# Patient Record
Sex: FEMALE | Race: WHITE | HISPANIC OR LATINO | Employment: UNEMPLOYED | URBAN - METROPOLITAN AREA
[De-identification: names, ages, dates, MRNs, and addresses within clinical notes are randomized per-mention and may not be internally consistent; named-entity substitution may affect disease eponyms.]

---

## 2017-08-22 ENCOUNTER — HOSPITAL ENCOUNTER (EMERGENCY)
Facility: HOSPITAL | Age: 37
Discharge: HOME | End: 2017-08-22
Attending: FAMILY MEDICINE

## 2017-08-22 VITALS
RESPIRATION RATE: 18 BRPM | HEART RATE: 82 BPM | BODY MASS INDEX: 50.02 KG/M2 | HEIGHT: 64 IN | DIASTOLIC BLOOD PRESSURE: 81 MMHG | OXYGEN SATURATION: 98 % | WEIGHT: 293 LBS | SYSTOLIC BLOOD PRESSURE: 145 MMHG | TEMPERATURE: 99 F

## 2017-08-22 DIAGNOSIS — R07.9 CHEST PAIN: ICD-10-CM

## 2017-08-22 DIAGNOSIS — R51.9 HEADACHE: ICD-10-CM

## 2017-08-22 LAB
ALBUMIN SERPL BCP-MCNC: 3.1 G/DL
ALP SERPL-CCNC: 93 U/L
ALT SERPL W/O P-5'-P-CCNC: 33 U/L
ANION GAP SERPL CALC-SCNC: 9 MMOL/L
AST SERPL-CCNC: 23 U/L
B-HCG UR QL: NEGATIVE
BASOPHILS # BLD AUTO: 0.02 K/UL
BASOPHILS NFR BLD: 0.3 %
BILIRUB SERPL-MCNC: 0.3 MG/DL
BNP SERPL-MCNC: 16 PG/ML
BUN SERPL-MCNC: 16 MG/DL
CALCIUM SERPL-MCNC: 8.9 MG/DL
CHLORIDE SERPL-SCNC: 107 MMOL/L
CO2 SERPL-SCNC: 25 MMOL/L
CREAT SERPL-MCNC: 0.9 MG/DL
CTP QC/QA: YES
DIFFERENTIAL METHOD: ABNORMAL
EOSINOPHIL # BLD AUTO: 0.3 K/UL
EOSINOPHIL NFR BLD: 4.9 %
ERYTHROCYTE [DISTWIDTH] IN BLOOD BY AUTOMATED COUNT: 14.6 %
EST. GFR  (AFRICAN AMERICAN): >60 ML/MIN/1.73 M^2
EST. GFR  (NON AFRICAN AMERICAN): >60 ML/MIN/1.73 M^2
GLUCOSE SERPL-MCNC: 96 MG/DL
HCT VFR BLD AUTO: 38.2 %
HGB BLD-MCNC: 12.5 G/DL
LYMPHOCYTES # BLD AUTO: 2.5 K/UL
LYMPHOCYTES NFR BLD: 38.3 %
MCH RBC QN AUTO: 27.4 PG
MCHC RBC AUTO-ENTMCNC: 32.7 G/DL
MCV RBC AUTO: 84 FL
MONOCYTES # BLD AUTO: 0.5 K/UL
MONOCYTES NFR BLD: 7.5 %
NEUTROPHILS # BLD AUTO: 3.2 K/UL
NEUTROPHILS NFR BLD: 48.8 %
PLATELET # BLD AUTO: 330 K/UL
PMV BLD AUTO: 10.7 FL
POTASSIUM SERPL-SCNC: 3.8 MMOL/L
PROT SERPL-MCNC: 7.5 G/DL
RBC # BLD AUTO: 4.56 M/UL
SODIUM SERPL-SCNC: 141 MMOL/L
TROPONIN I SERPL DL<=0.01 NG/ML-MCNC: <0.006 NG/ML
TSH SERPL DL<=0.005 MIU/L-ACNC: 2.58 UIU/ML
WBC # BLD AUTO: 6.56 K/UL

## 2017-08-22 PROCEDURE — 84484 ASSAY OF TROPONIN QUANT: CPT

## 2017-08-22 PROCEDURE — 96374 THER/PROPH/DIAG INJ IV PUSH: CPT

## 2017-08-22 PROCEDURE — 99284 EMERGENCY DEPT VISIT MOD MDM: CPT | Mod: ,,, | Performed by: EMERGENCY MEDICINE

## 2017-08-22 PROCEDURE — 63600175 PHARM REV CODE 636 W HCPCS: Performed by: EMERGENCY MEDICINE

## 2017-08-22 PROCEDURE — 93010 ELECTROCARDIOGRAM REPORT: CPT | Mod: ,,, | Performed by: INTERNAL MEDICINE

## 2017-08-22 PROCEDURE — 85025 COMPLETE CBC W/AUTO DIFF WBC: CPT

## 2017-08-22 PROCEDURE — 93005 ELECTROCARDIOGRAM TRACING: CPT

## 2017-08-22 PROCEDURE — 83880 ASSAY OF NATRIURETIC PEPTIDE: CPT

## 2017-08-22 PROCEDURE — 99284 EMERGENCY DEPT VISIT MOD MDM: CPT | Mod: 25

## 2017-08-22 PROCEDURE — 81025 URINE PREGNANCY TEST: CPT | Performed by: FAMILY MEDICINE

## 2017-08-22 PROCEDURE — 84443 ASSAY THYROID STIM HORMONE: CPT

## 2017-08-22 PROCEDURE — 80053 COMPREHEN METABOLIC PANEL: CPT

## 2017-08-22 RX ORDER — FLUOXETINE HYDROCHLORIDE 20 MG/1
20 CAPSULE ORAL DAILY
COMMUNITY

## 2017-08-22 RX ORDER — LEVOTHYROXINE SODIUM 50 UG/1
50 TABLET ORAL DAILY
COMMUNITY

## 2017-08-22 RX ORDER — LOSARTAN POTASSIUM 25 MG/1
100 TABLET ORAL DAILY
COMMUNITY

## 2017-08-22 RX ORDER — METOCLOPRAMIDE HYDROCHLORIDE 5 MG/ML
10 INJECTION INTRAMUSCULAR; INTRAVENOUS
Status: COMPLETED | OUTPATIENT
Start: 2017-08-22 | End: 2017-08-22

## 2017-08-22 RX ADMIN — METOCLOPRAMIDE 10 MG: 5 INJECTION, SOLUTION INTRAMUSCULAR; INTRAVENOUS at 05:08

## 2017-08-22 NOTE — ED PROVIDER NOTES
Encounter Date: 8/22/2017       History     Chief Complaint   Patient presents with    Headache     vomiting since friday     Ellie Ray is a 37 y.o. Bengali speaking female who  has a past medical history of Hypertension; Hypothyroid; and Obesity.    Patient presents with chief comlaint of headache.  Reports pain started last week, has been taking asprin with no relief.  Reports nausea, and phonophobia, but no photophobia.  Pain extends from head to the neck.  Patient says she has active chest pain which started yesterday in her left chest, she has a cough that produces yellow to green phlem which she contributes to the flue she had 2 weeks ago.  Denies F/C/N/V                Review of patient's allergies indicates:  No Known Allergies  Past Medical History:   Diagnosis Date    Hypertension     Hypothyroid     Obesity      History reviewed. No pertinent surgical history.  Family History   Problem Relation Age of Onset    No Known Problems Mother     No Known Problems Father      Social History   Substance Use Topics    Smoking status: Never Smoker    Smokeless tobacco: Never Used    Alcohol use No     Review of Systems   Constitutional: Negative for chills and fever.   HENT: Positive for congestion and sore throat.    Eyes: Negative for photophobia and visual disturbance.   Respiratory: Positive for cough. Negative for shortness of breath.    Cardiovascular: Positive for chest pain. Negative for palpitations and leg swelling.   Gastrointestinal: Positive for nausea. Negative for abdominal distention, abdominal pain, blood in stool, diarrhea and vomiting.        Gastric reflux   Genitourinary: Negative for dysuria and hematuria.   Musculoskeletal: Negative for arthralgias and gait problem.   Skin: Negative for rash and wound.   Neurological: Positive for headaches. Negative for dizziness, weakness and light-headedness.   Psychiatric/Behavioral: Negative for confusion and self-injury.       Physical Exam      Initial Vitals [08/22/17 1415]   BP Pulse Resp Temp SpO2   135/89 100 18 99.2 °F (37.3 °C) 100 %      MAP       104.33         Physical Exam  Constitutional: Patient appears well-developed and well-nourished. No distress.   HENT:   Head: Normocephalic and atraumatic.   Neck: No tracheal deviation present.   Cardiovascular: Normal rate, regular rhythm, normal heart sounds and intact distal pulses. Exam reveals no gallop and no friction rub.   Pain reproducable with pressure to chest.  No murmur heard.  Pulmonary/Chest: Breath sounds normal. No stridor. No respiratory distress. Patient has no wheezes. Patient has no rhonchi. Patient has no rales. Patient exhibits no tenderness.   Abdominal: Soft. Bowel sounds are normal. Patient exhibits no distension. There is no tenderness.   Musculoskeletal: Normal range of motion. Patient exhibits no edema or tenderness.   Neurological: Patient is alert and oriented to person, place, and time. Patient has normal strength.   Psychiatric: Patient has a normal mood and affect. Thought content normal.       ED Course   Procedures  Labs Reviewed   CBC W/ AUTO DIFFERENTIAL - Abnormal; Notable for the following:        Result Value    RDW 14.6 (*)     All other components within normal limits   COMPREHENSIVE METABOLIC PANEL - Abnormal; Notable for the following:     Albumin 3.1 (*)     All other components within normal limits   B-TYPE NATRIURETIC PEPTIDE   TROPONIN I   TSH   POCT URINE PREGNANCY                   APC / Resident Notes:   -Ordered EKG, Trop, BNP, CBC, CMP, TSH, CT head  Matt Novoa, PGY1     [place] of Care  I discussed this patient's historical and physical findings, as well as the current plan for this patient. I reevaluated the patient and found that her lab results show no emergent abnormalities. Trop x1 was negative, as was ecg. CT head showed no concerning findings. I discussed these results with the pt (using a  phone) and also discussed the  utility of obtaining an MRI Brain to further characterize the source of the pain, as well as look for any masses or inflammation that may have been missed on Ct. On reexamination of the pt, she has CN II-XII in tact, nl gait and strength in arms and legs. She does not complain of dizziness, photophobia, meningismus. She no longer has chest pain, and states her HA is now very mild and not bothering her. She would like to go home and does not want an MRI here. Given the HA has responded to reglan and the chest pain is no longer present, I feel the symptoms may have been caused by a migraine. I will discharge the pt home and have given clear return precautions using a : please return to the emergency department if you have any difficulty with gait, weakness in arms/legs, severe return of Noel. She agrees, and will follow up with her regular PCP for further management.    Sage Diaz  Resident, PGY-1  8/22/2017 7:21 PM          Attending Attestation:   Physician Attestation Statement for Resident:  As the supervising MD   Physician Attestation Statement: I have personally seen and examined this patient.   I agree with the above history. -: 38 yo healthy f, here with HA x 1 week, gradual onset, diffuse and severe with neck pain.  No fever/chills, no photophobia.  + n/v.  No h/o migraines/HA disorder.  No recent travel.  No visual changes.  No rash.  Today also had CP, worse w pushing on her chest so became concerned.  No SOB.  No pain with exertion. In the ED, pt morbidly obese, VSS and pt well-appearing.  PERRL, no photophobia, neuro exam normal.  ddx would be broad, including SAH, venous thrombosis, idiopathic intracranial HTN.  Meningitis seems less likely given lack of any infectious sx.  Given pt's significant body habitus, I would NOT be able to obtain a LP to evaluate for meningitis, though again my suspicion is low.  Will get a MRI brain with a MRA to evaluate for aneurysm and if this study is negative,  will d/c home with close outpatient f/u with neurology for further work-up   As the supervising MD I agree with the above PE.    As the supervising MD I agree with the above treatment, course, plan, and disposition.  I have reviewed and agree with the residents interpretation of the following: lab data and CT scans.                    ED Course     Clinical Impression:   Diagnoses of Chest pain and Headache were pertinent to this visit.                           Sage Diaz MD  Resident  08/23/17 1339

## 2017-08-22 NOTE — PROVIDER PROGRESS NOTES - EMERGENCY DEPT.
Encounter Date: 8/22/2017    ED Physician Progress Notes         normal sinus rhythm.  Normal EKG.  No STEMI

## 2017-08-22 NOTE — PROVIDER PROGRESS NOTES - EMERGENCY DEPT.
Encounter Date: 8/22/2017    ED Physician Progress Notes       SCRIBE NOTE: I, Butch Guo, am scribing for, and in the presence of,  Dr. Taylor.  Physician Statement: I, Dr. Taylor, personally performed the services described in this documentation as scribed by Butch Guo in my presence, and it is both accurate and complete.     Physician Note:   Pt is a 37 y.o. female with a history of HTN and hypothyroidism who comes to the ER complaining of chest pain for several days, difficulty controlling blood pressure, and headaches. Blood pressure today is 135/89. Her initial exam is benign. EKG looks unremarkable. We will start a cardiac work up including chest x-ray and refer her to the main ED for final disposition.

## 2017-08-22 NOTE — ED TRIAGE NOTES
Complaint of chest pain and headache for 5 days.    GENERAL: The patient is morbidly obese femalein no apparent distress. Alert and oriented x4.                                                HEENT: Head is normocephalic and atraumatic. Extraocular muscles are intact. Pupils are equal, round, and reactive to light and accommodation. Nares appeared normal. Mouth is well hydrated and without lesions. Mucous membranes are moist. Posterior pharynx clear of any exudate or lesions.    NECK: Supple. No carotid bruits. No lymphadenopathy or thyromegaly.    LUNGS: Clear to auscultation.    HEART: Regular rate and rhythm without murmur.     ABDOMEN: Soft, nontender, and nondistended. Positive bowel sounds. No hepatosplenomegaly was noted.     EXTREMITIES: Without any cyanosis, clubbing, rash, lesions or edema.     NEUROLOGIC: Cranial nerves II through XII are grossly intact.     PSYCHIATRIC: Flat affect, but denies suicidal or homicidal ideations.    SKIN: No ulceration or induration present.

## 2017-09-14 ENCOUNTER — HOSPITAL ENCOUNTER (EMERGENCY)
Facility: HOSPITAL | Age: 37
Discharge: HOME | End: 2017-09-14
Attending: EMERGENCY MEDICINE

## 2017-09-14 VITALS
OXYGEN SATURATION: 98 % | TEMPERATURE: 99 F | WEIGHT: 293 LBS | SYSTOLIC BLOOD PRESSURE: 120 MMHG | BODY MASS INDEX: 39.68 KG/M2 | HEART RATE: 68 BPM | HEIGHT: 72 IN | RESPIRATION RATE: 20 BRPM | DIASTOLIC BLOOD PRESSURE: 58 MMHG

## 2017-09-14 DIAGNOSIS — T50.905A MEDICATION REACTION, INITIAL ENCOUNTER: Primary | ICD-10-CM

## 2017-09-14 DIAGNOSIS — N39.0 URINARY TRACT INFECTION WITHOUT HEMATURIA, SITE UNSPECIFIED: ICD-10-CM

## 2017-09-14 DIAGNOSIS — R00.0 TACHYCARDIA: ICD-10-CM

## 2017-09-14 DIAGNOSIS — R00.2 PALPITATIONS: ICD-10-CM

## 2017-09-14 LAB
ALBUMIN SERPL BCP-MCNC: 3.6 G/DL
ALP SERPL-CCNC: 97 U/L
ALT SERPL W/O P-5'-P-CCNC: 42 U/L
ANION GAP SERPL CALC-SCNC: 9 MMOL/L
AST SERPL-CCNC: 28 U/L
B-HCG UR QL: NEGATIVE
BACTERIA #/AREA URNS HPF: ABNORMAL /HPF
BASOPHILS # BLD AUTO: 0.02 K/UL
BASOPHILS NFR BLD: 0.3 %
BILIRUB SERPL-MCNC: 0.3 MG/DL
BILIRUB UR QL STRIP: NEGATIVE
BUN SERPL-MCNC: 9 MG/DL
CALCIUM SERPL-MCNC: 9.8 MG/DL
CHLORIDE SERPL-SCNC: 103 MMOL/L
CLARITY UR: CLEAR
CO2 SERPL-SCNC: 25 MMOL/L
COLOR UR: ABNORMAL
CREAT SERPL-MCNC: 0.8 MG/DL
CTP QC/QA: YES
D DIMER PPP IA.FEU-MCNC: 0.37 MG/L FEU
DIFFERENTIAL METHOD: ABNORMAL
EOSINOPHIL # BLD AUTO: 0.2 K/UL
EOSINOPHIL NFR BLD: 3 %
ERYTHROCYTE [DISTWIDTH] IN BLOOD BY AUTOMATED COUNT: 14.9 %
EST. GFR  (AFRICAN AMERICAN): >60 ML/MIN/1.73 M^2
EST. GFR  (NON AFRICAN AMERICAN): >60 ML/MIN/1.73 M^2
GLUCOSE SERPL-MCNC: 138 MG/DL
GLUCOSE UR QL STRIP: NEGATIVE
HCT VFR BLD AUTO: 40.3 %
HGB BLD-MCNC: 13.2 G/DL
HGB UR QL STRIP: ABNORMAL
KETONES UR QL STRIP: NEGATIVE
LEUKOCYTE ESTERASE UR QL STRIP: ABNORMAL
LYMPHOCYTES # BLD AUTO: 1.3 K/UL
LYMPHOCYTES NFR BLD: 21.3 %
MAGNESIUM SERPL-MCNC: 2.1 MG/DL
MCH RBC QN AUTO: 27.3 PG
MCHC RBC AUTO-ENTMCNC: 32.8 G/DL
MCV RBC AUTO: 83 FL
MICROSCOPIC COMMENT: ABNORMAL
MONOCYTES # BLD AUTO: 0.4 K/UL
MONOCYTES NFR BLD: 6.4 %
NEUTROPHILS # BLD AUTO: 4.2 K/UL
NEUTROPHILS NFR BLD: 68.3 %
NITRITE UR QL STRIP: NEGATIVE
PH UR STRIP: 6 [PH] (ref 5–8)
PLATELET # BLD AUTO: 305 K/UL
PMV BLD AUTO: 10.9 FL
POTASSIUM SERPL-SCNC: 3.6 MMOL/L
PROT SERPL-MCNC: 8.1 G/DL
PROT UR QL STRIP: NEGATIVE
RBC # BLD AUTO: 4.83 M/UL
RBC #/AREA URNS HPF: 14 /HPF (ref 0–4)
SODIUM SERPL-SCNC: 137 MMOL/L
SP GR UR STRIP: <=1.005 (ref 1–1.03)
SQUAMOUS #/AREA URNS HPF: 2 /HPF
URN SPEC COLLECT METH UR: ABNORMAL
UROBILINOGEN UR STRIP-ACNC: NEGATIVE EU/DL
WBC # BLD AUTO: 6.09 K/UL
WBC #/AREA URNS HPF: 7 /HPF (ref 0–5)

## 2017-09-14 PROCEDURE — 81000 URINALYSIS NONAUTO W/SCOPE: CPT

## 2017-09-14 PROCEDURE — 83735 ASSAY OF MAGNESIUM: CPT

## 2017-09-14 PROCEDURE — 87186 SC STD MICRODIL/AGAR DIL: CPT

## 2017-09-14 PROCEDURE — 87077 CULTURE AEROBIC IDENTIFY: CPT

## 2017-09-14 PROCEDURE — 81025 URINE PREGNANCY TEST: CPT | Performed by: EMERGENCY MEDICINE

## 2017-09-14 PROCEDURE — 85025 COMPLETE CBC W/AUTO DIFF WBC: CPT

## 2017-09-14 PROCEDURE — 99284 EMERGENCY DEPT VISIT MOD MDM: CPT | Mod: 25

## 2017-09-14 PROCEDURE — 80053 COMPREHEN METABOLIC PANEL: CPT

## 2017-09-14 PROCEDURE — 87086 URINE CULTURE/COLONY COUNT: CPT

## 2017-09-14 PROCEDURE — 87088 URINE BACTERIA CULTURE: CPT

## 2017-09-14 PROCEDURE — 93005 ELECTROCARDIOGRAM TRACING: CPT

## 2017-09-14 PROCEDURE — 85379 FIBRIN DEGRADATION QUANT: CPT

## 2017-09-14 RX ORDER — CEPHALEXIN 500 MG/1
500 CAPSULE ORAL EVERY 8 HOURS
Qty: 15 CAPSULE | Refills: 0 | Status: SHIPPED | OUTPATIENT
Start: 2017-09-14 | End: 2017-09-18 | Stop reason: ALTCHOICE

## 2017-09-15 NOTE — DISCHARGE INSTRUCTIONS
TAKE MEDS AS DIRECTED    AVOID EXCEDRIN    CALL YOUR PRIMARY DOCTOR TO SCHEDULE A FOLLOW UP EVALUATION NEXT WEEK    RETURN TO ED IF SYMPTOMS WORSEN

## 2017-09-15 NOTE — ED PROVIDER NOTES
Encounter Date: 9/14/2017       History     Chief Complaint   Patient presents with    Palpitations     reports increase in heart rate after taking excedrin      38 Y/O F WITH PMHX OF HYPOTHYROIDISM, HTN, AND OBESITY PRESENTS TO ED WITH C/O PALPITATIONS SINCE 1530.  PT WAS SEEN BY HER PCP AT [facility] YESTERDAY.  HER PROZAC DOSE WAS INCREASED DURING THIS VISIT.  NO OTHER MEDICATION CHANGES MADE.  THE PATIENT HAS CHRONIC HA FOR THE PAST YEAR AND STATES THAT SHE HAD A VERY MILD, THROBBING, NON-RADIATING, LEFT SIDED HA THIS MORNING AND TOOK TWO EXCEDRIN AND TWO ASA AT 1200.  THE HA RESOLVED.  AT 1530, THE PATIENT TOOK ANOTHER 2 EXCEDRIN AND 2 ASA TO MAKE SURE THE HA DIDN'T COME BACK.  PT HAS A HX OF HA AND STATES THAT COMPARED TO PREVIOUS THIS HA WAS VERY MILD PAIN.  NOT WORST HA OF LIFE.  NOT OF THUNDERCLAP ONSET.  NO ASSOCIATED NUMBNESS/WEAKNESS/TINGLING, VISION CHANGES, OR SPEECH CHANGES.  PT BEGAN FEELING HER HEART RACE 30 MINUTES OR SO AFTER TAKING THE SECOND DOSE OF EXCEDRIN.  SHE NEVER FELT ANY CP OR SOB.  NO FEVER/CHILLS.  + DIAPHORESIS.  NO N/V.  NO ABD PAIN.  PT DENIES ANY RECENT ILLNESS OR PREVIOUS SIMILAR SYMPTOMS.  PT CURRENTLY FEELS BETTER.      PT INTERVIEWED VIA  LINE.           Review of patient's allergies indicates:  No Known Allergies  Past Medical History:   Diagnosis Date    Hypertension     Hypothyroid     Obesity      History reviewed. No pertinent surgical history.  Family History   Problem Relation Age of Onset    No Known Problems Mother     No Known Problems Father      Social History   Substance Use Topics    Smoking status: Never Smoker    Smokeless tobacco: Never Used    Alcohol use No     Review of Systems   Constitutional: Positive for diaphoresis. Negative for activity change, appetite change, chills, fatigue and fever.   HENT: Negative for congestion.    Eyes: Negative for visual disturbance.   Respiratory: Negative for cough, chest tightness and  shortness of breath.    Cardiovascular: Negative for chest pain and palpitations.   Gastrointestinal: Negative for abdominal pain, diarrhea, nausea and vomiting.   Endocrine: Negative for polydipsia and polyphagia.   Genitourinary: Negative for difficulty urinating, dysuria, flank pain and frequency.   Musculoskeletal: Negative for back pain.   Skin: Negative for pallor and rash.   Allergic/Immunologic: Negative for immunocompromised state.   Neurological: Positive for headaches (PT HAS A HX OF HEADACHES FOR THE PAST YEAR AND HAD A HA TODAY THAT SHE WAS TREATING WITH EXCEDRINE.  SWAN IS NOW RESOLVED). Negative for dizziness, tremors, seizures, syncope, facial asymmetry, speech difficulty, weakness, light-headedness and numbness.   Hematological: Does not bruise/bleed easily.   Psychiatric/Behavioral: Negative for confusion. The patient is nervous/anxious.    All other systems reviewed and are negative.      Physical Exam     Initial Vitals [09/14/17 1827]   BP Pulse Resp Temp SpO2   (!) 189/96 (!) 114 20 98.8 °F (37.1 °C) 99 %      MAP       127         Physical Exam    Constitutional: She appears well-developed and well-nourished. She is diaphoretic.   HENT:   Head: Normocephalic and atraumatic.   Mouth/Throat: Oropharynx is clear and moist.   Eyes: Conjunctivae and EOM are normal. Pupils are equal, round, and reactive to light. No scleral icterus.   Neck: Normal range of motion. Neck supple.   Cardiovascular: Regular rhythm and normal heart sounds.   TACHYCARDIA   Pulmonary/Chest: Breath sounds normal. No respiratory distress. She has no wheezes. She has no rhonchi. She has no rales.   Abdominal: Soft. Bowel sounds are normal. She exhibits no distension. There is no tenderness.   Musculoskeletal: Normal range of motion. She exhibits no edema or tenderness.   Neurological: She is alert and oriented to person, place, and time. She has normal strength. No cranial nerve deficit or sensory deficit.   Skin: Skin is warm.  No pallor.   Psychiatric: Her behavior is normal. Judgment and thought content normal.   ANXIOUS         ED Course   Procedures  Labs Reviewed   CBC W/ AUTO DIFFERENTIAL   COMPREHENSIVE METABOLIC PANEL   URINALYSIS   MAGNESIUM     EKG Readings: (Independently Interpreted)   Initial Reading: No STEMI. Rhythm: Sinus Tachycardia. Heart Rate: 109. Ectopy: No Ectopy. Clinical Impression: Sinus Tachycardia       X-Rays:   Independently Interpreted Readings:   Chest X-Ray: Normal heart size.  No infiltrates.  No acute abnormalities.     Medical Decision Making:   Initial Assessment:   36 Y/O WITH PALPITATIONS AFTER TAKING TWO DOSES OF EXCEDRIN IN 3.5 HOURS.  NO CP OR SOB.      ON EXAM, PT IS IN NO DISTRESS.    Differential Diagnosis:   DDX:  METABOLIC DERANGEMENT, ANEMIA, DEHYDRATION, MEDICATION REACTION, ACS, PULMONARY EMBOLUS, ARRHYTHMIA  Independently Interpreted Test(s):   I have ordered and independently interpreted X-rays - see prior notes.  I have ordered and independently interpreted EKG Reading(s) - see prior notes  Clinical Tests:   Lab Tests: Ordered and Reviewed  The following lab test(s) were unremarkable: CBC, CMP and D-Dimer       <> Summary of Lab: U/A:  + UTI  MAG NORMAL    Radiological Study: Ordered and Reviewed  Medical Tests: Ordered and Reviewed  ED Management:  PT WORK UP + FOR UTI.  OTHERWISE NO ACUTE FINDINGS NOTED.  THE PATIENT IS WELL APPEARING AND NON-TOXIC.  ON RE-EVALUATION, SHE IS RESTING IN STRETCHER WITH  AND DAUGHTER AT BEDSIDE.  NO SYMPTOMS.  NO DISTRESS.  PALPITATIONS HAVE RESOLVED.  I WILL START KEFLEX FOR UTI AND SEND A URINE CULTURE.  PT INSTRUCTED TO F/U WITH HER PCP NEXT WEEK.  SHE IS INSTRUCTED TO RETURN TO ED IMMEDIATELY IF SYMPTOMS WORSEN IN ANY WAY.     Pt counseled on their diagnosis and the importance of following up with PCP.  Pt also cautioned on when to return to ED.  Pt verbalizes understanding of discharge plan and will return to ED immediately if symptoms  worsen                     ED Course      Clinical Impression:   The primary encounter diagnosis was Medication reaction, initial encounter. Diagnoses of Tachycardia, Palpitations, and Urinary tract infection without hematuria, site unspecified were also pertinent to this visit.    Disposition:   Disposition: Discharged  Condition: Stable                        Susie Valentin MD  09/14/17 9824

## 2017-09-15 NOTE — ED NOTES
Pt states this a.m. She had vomiting with nausea and a headache-took 2 aspirins and an excedrin and then developed palpitations--pt currently is diaphoretic,resp are regular and unlabored.deneis chest pain. Pt is alert and oriented x3 with clear speech and followsing commands. Skin is normal coloring. Pt feels hot to touch.

## 2017-09-18 ENCOUNTER — HOSPITAL ENCOUNTER (EMERGENCY)
Facility: HOSPITAL | Age: 37
Discharge: HOME | End: 2017-09-18
Attending: EMERGENCY MEDICINE

## 2017-09-18 VITALS
OXYGEN SATURATION: 95 % | HEART RATE: 100 BPM | TEMPERATURE: 98 F | WEIGHT: 293 LBS | RESPIRATION RATE: 20 BRPM | BODY MASS INDEX: 45.99 KG/M2 | HEIGHT: 67 IN | SYSTOLIC BLOOD PRESSURE: 129 MMHG | DIASTOLIC BLOOD PRESSURE: 80 MMHG

## 2017-09-18 DIAGNOSIS — N39.0 URINARY TRACT INFECTION WITHOUT HEMATURIA, SITE UNSPECIFIED: ICD-10-CM

## 2017-09-18 DIAGNOSIS — R00.2 PALPITATIONS: Primary | ICD-10-CM

## 2017-09-18 LAB
ALBUMIN SERPL BCP-MCNC: 3.3 G/DL
ALP SERPL-CCNC: 109 U/L
ALT SERPL W/O P-5'-P-CCNC: 37 U/L
ANION GAP SERPL CALC-SCNC: 9 MMOL/L
AST SERPL-CCNC: 20 U/L
B-HCG UR QL: NEGATIVE
BACTERIA #/AREA URNS HPF: ABNORMAL /HPF
BACTERIA UR CULT: NORMAL
BASOPHILS # BLD AUTO: 0.03 K/UL
BASOPHILS NFR BLD: 0.5 %
BILIRUB SERPL-MCNC: 0.3 MG/DL
BILIRUB UR QL STRIP: NEGATIVE
BUN SERPL-MCNC: 18 MG/DL
CALCIUM SERPL-MCNC: 9.4 MG/DL
CHLORIDE SERPL-SCNC: 102 MMOL/L
CLARITY UR: ABNORMAL
CO2 SERPL-SCNC: 26 MMOL/L
COLOR UR: YELLOW
CREAT SERPL-MCNC: 0.9 MG/DL
CTP QC/QA: YES
DIFFERENTIAL METHOD: ABNORMAL
EOSINOPHIL # BLD AUTO: 0.2 K/UL
EOSINOPHIL NFR BLD: 2.3 %
ERYTHROCYTE [DISTWIDTH] IN BLOOD BY AUTOMATED COUNT: 15.1 %
EST. GFR  (AFRICAN AMERICAN): >60 ML/MIN/1.73 M^2
EST. GFR  (NON AFRICAN AMERICAN): >60 ML/MIN/1.73 M^2
GLUCOSE SERPL-MCNC: 142 MG/DL
GLUCOSE UR QL STRIP: NEGATIVE
HCT VFR BLD AUTO: 40.4 %
HGB BLD-MCNC: 13 G/DL
HGB UR QL STRIP: ABNORMAL
KETONES UR QL STRIP: NEGATIVE
LEUKOCYTE ESTERASE UR QL STRIP: ABNORMAL
LYMPHOCYTES # BLD AUTO: 2.1 K/UL
LYMPHOCYTES NFR BLD: 32.1 %
MCH RBC QN AUTO: 27.4 PG
MCHC RBC AUTO-ENTMCNC: 32.2 G/DL
MCV RBC AUTO: 85 FL
MICROSCOPIC COMMENT: ABNORMAL
MONOCYTES # BLD AUTO: 0.5 K/UL
MONOCYTES NFR BLD: 8.3 %
NEUTROPHILS # BLD AUTO: 3.7 K/UL
NEUTROPHILS NFR BLD: 56.6 %
NITRITE UR QL STRIP: NEGATIVE
PH UR STRIP: 6 [PH] (ref 5–8)
PLATELET # BLD AUTO: 322 K/UL
PMV BLD AUTO: 10.1 FL
POTASSIUM SERPL-SCNC: 3.4 MMOL/L
PROT SERPL-MCNC: 7.5 G/DL
PROT UR QL STRIP: ABNORMAL
RBC # BLD AUTO: 4.75 M/UL
RBC #/AREA URNS HPF: 20 /HPF (ref 0–4)
SODIUM SERPL-SCNC: 137 MMOL/L
SP GR UR STRIP: 1.01 (ref 1–1.03)
SQUAMOUS #/AREA URNS HPF: 16 /HPF
TSH SERPL DL<=0.005 MIU/L-ACNC: 3.69 UIU/ML
URN SPEC COLLECT METH UR: ABNORMAL
UROBILINOGEN UR STRIP-ACNC: NEGATIVE EU/DL
WBC # BLD AUTO: 6.52 K/UL
WBC #/AREA URNS HPF: 45 /HPF (ref 0–5)

## 2017-09-18 PROCEDURE — 25000003 PHARM REV CODE 250: Performed by: EMERGENCY MEDICINE

## 2017-09-18 PROCEDURE — 93005 ELECTROCARDIOGRAM TRACING: CPT

## 2017-09-18 PROCEDURE — 81025 URINE PREGNANCY TEST: CPT | Performed by: EMERGENCY MEDICINE

## 2017-09-18 PROCEDURE — 99284 EMERGENCY DEPT VISIT MOD MDM: CPT | Mod: 25

## 2017-09-18 PROCEDURE — 81000 URINALYSIS NONAUTO W/SCOPE: CPT

## 2017-09-18 PROCEDURE — 93010 ELECTROCARDIOGRAM REPORT: CPT | Mod: ,,, | Performed by: INTERNAL MEDICINE

## 2017-09-18 PROCEDURE — 85025 COMPLETE CBC W/AUTO DIFF WBC: CPT

## 2017-09-18 PROCEDURE — 84443 ASSAY THYROID STIM HORMONE: CPT

## 2017-09-18 PROCEDURE — 80053 COMPREHEN METABOLIC PANEL: CPT

## 2017-09-18 RX ORDER — HYDROXYZINE PAMOATE 25 MG/1
25 CAPSULE ORAL 4 TIMES DAILY
Qty: 30 CAPSULE | Refills: 0 | Status: SHIPPED | OUTPATIENT
Start: 2017-09-18

## 2017-09-18 RX ADMIN — SODIUM CHLORIDE 1000 ML: 0.9 INJECTION, SOLUTION INTRAVENOUS at 04:09

## 2017-09-18 NOTE — PROVIDER PROGRESS NOTES - EMERGENCY DEPT.
Encounter Date: 9/14/2017    ED Physician Progress Notes         09/17/2017 3:58 PM culture is positive for Proteus although it is noted to be less than 50,000; patient was sent home with Keflex.  We are awaiting sensitivity.  LC    09/18/2017 3:47 PM Culture was not tested and sensitivity however culture is pansensitive and less than 50,000 LC

## 2017-09-18 NOTE — DISCHARGE INSTRUCTIONS
Continue to take your medications as prescribed.  Follow up with your primary care physician for further evaluation of your palpitations.  Avoid stress and confrontation.  Return to the emergency department if you have any concerns.  Please refer to the additional material provided for further management

## 2017-11-28 ENCOUNTER — HOSPITAL ENCOUNTER (EMERGENCY)
Facility: HOSPITAL | Age: 37
Discharge: HOME | End: 2017-11-28
Attending: EMERGENCY MEDICINE

## 2017-11-28 VITALS
TEMPERATURE: 99 F | HEART RATE: 93 BPM | RESPIRATION RATE: 18 BRPM | HEIGHT: 68 IN | BODY MASS INDEX: 44.41 KG/M2 | WEIGHT: 293 LBS | DIASTOLIC BLOOD PRESSURE: 64 MMHG | OXYGEN SATURATION: 97 % | SYSTOLIC BLOOD PRESSURE: 132 MMHG

## 2017-11-28 DIAGNOSIS — I10 ESSENTIAL HYPERTENSION: Primary | ICD-10-CM

## 2017-11-28 PROCEDURE — 99283 EMERGENCY DEPT VISIT LOW MDM: CPT

## 2017-11-28 RX ORDER — BLOOD PRESSURE TEST KIT-MEDIUM
1 KIT MISCELLANEOUS 3 TIMES DAILY
Qty: 1 KIT | Refills: 0 | Status: SHIPPED | OUTPATIENT
Start: 2017-11-28

## 2017-11-28 NOTE — ED PROVIDER NOTES
Encounter Date: 11/28/2017       History     Chief Complaint   Patient presents with    Hypertension     reports checked BP at [facility] X 1 hour ago and it was 163/95. Reports took prescribed BP medication today, denies skipping any doses.      This is a 36 y/o F with history of recently diagnosed hypertension, reports that she took her BP at [facility] about an hour ago and was concerned because it was elevated at 163/95.  Denies chest pain, shortness of breath, palpitations.  Mild headache which is a common symptom for the patient.  Started cozaar yesterday and was monitoring BP according to her doctor's instructions.  No vomiting, visual changes, weakness or numbness.       The history is provided by the patient.   General Illness    Associated symptoms include headaches. Pertinent negatives include no fever, no nausea, no sore throat, no shortness of breath and no rash.     Review of patient's allergies indicates:  No Known Allergies  Past Medical History:   Diagnosis Date    Hypertension     Hypothyroid     Obesity      No past surgical history on file.  Family History   Problem Relation Age of Onset    No Known Problems Mother     No Known Problems Father      Social History   Substance Use Topics    Smoking status: Never Smoker    Smokeless tobacco: Never Used    Alcohol use No     Review of Systems   Constitutional: Negative for fever.   HENT: Negative for sore throat.    Respiratory: Negative for shortness of breath.    Cardiovascular: Negative for chest pain.   Gastrointestinal: Negative for nausea.   Genitourinary: Negative for dysuria.   Musculoskeletal: Negative for back pain.   Skin: Negative for rash.   Neurological: Positive for headaches. Negative for weakness.   Hematological: Does not bruise/bleed easily.   All other systems reviewed and are negative.      Physical Exam     Initial Vitals [11/28/17 0103]   BP Pulse Resp Temp SpO2   (!) 134/96 (!) 116 15 98.5 °F (36.9 °C) 100 %      MAP        108.67         Physical Exam    Nursing note and vitals reviewed.  Constitutional: She appears well-developed and well-nourished.   Obese, comfortable appearing.    HENT:   Head: Normocephalic and atraumatic.   Eyes: Conjunctivae and EOM are normal. Pupils are equal, round, and reactive to light. Right eye exhibits no discharge. Left eye exhibits no discharge. No scleral icterus.   Neck: Normal range of motion. Neck supple.   Cardiovascular: Normal rate, regular rhythm and normal heart sounds. Exam reveals no gallop and no friction rub.    No murmur heard.  Current HR 92.     Pulmonary/Chest: Breath sounds normal. No stridor. No respiratory distress. She has no wheezes. She has no rhonchi. She has no rales.   Abdominal: Soft. Bowel sounds are normal. She exhibits no distension and no mass. There is no tenderness. There is no rebound and no guarding.   Musculoskeletal: Normal range of motion. She exhibits no edema or tenderness.   Neurological: She is alert and oriented to person, place, and time. She has normal strength. No cranial nerve deficit or sensory deficit.   Skin: Skin is warm and dry. Capillary refill takes less than 2 seconds.   Psychiatric: She has a normal mood and affect. Her behavior is normal. Judgment and thought content normal.         ED Course   Procedures  Labs Reviewed - No data to display          Medical Decision Making:   Initial Assessment:   Elevated BP with cuff that was likely too small for patient.  Will rx BP cuff and recommend checking 2-3 times a day and calling PCP with readings.  Pt knows she needs to return for chest pain, severe headache, shortness of breath.                     ED Course      Clinical Impression:   The encounter diagnosis was Essential hypertension.    Disposition:   Disposition: Discharged  Condition: Stable                        Kim Echevarria MD  11/28/17 0201

## 2017-11-28 NOTE — ED TRIAGE NOTES
Pt presents to ED stating that she checked her blood pressure at [facility] 1 hour PTA and it was high. Pt has hx of HTN. States she took her bp med today. Denies chest pain, denies headache, denies blurred vision, denies SOB.    General Appearance:    Awake, Alert and Oriented. Calm and cooperative, no acute  Distress noted. Speech clear, follows command. GCS15   Head:    without obvious abnormality, atraumatic   Eyes:    Denies blurred vision   Ears:    Normal TM's and external ear canals, both ears   Nose:   Nares normal, septum midline, mucosa normal, no drainage     or sinus tenderness   Throat:   Lips, mucosa, and tongue normal   Neck:   symmetrical, trachea midline   Back:     Symmetric, ROM normal   Lungs:     respirations even and unlabored, no shortness of breath reported   Chest Wall:    No tenderness or deformity    Heart:    Regular rate and rhythm, S1 and S2 normal, denies chest pain    Abdomen:     Soft, non-tender, denies NVD   Extremities:   Extremities normal, ROM normal    Pulses:   2+ and symmetric bilateral upper ext   Skin:   Skin color, texture,  no rashes or lesions   Neurologic:   normal strength, sensation and reflexes     throughout

## 2018-05-22 ENCOUNTER — HOSPITAL ENCOUNTER (EMERGENCY)
Facility: HOSPITAL | Age: 38
Discharge: HOME | End: 2018-05-22
Attending: EMERGENCY MEDICINE

## 2018-05-22 VITALS
SYSTOLIC BLOOD PRESSURE: 117 MMHG | OXYGEN SATURATION: 96 % | DIASTOLIC BLOOD PRESSURE: 58 MMHG | TEMPERATURE: 99 F | WEIGHT: 293 LBS | HEART RATE: 91 BPM | HEIGHT: 68 IN | RESPIRATION RATE: 20 BRPM | BODY MASS INDEX: 44.41 KG/M2

## 2018-05-22 DIAGNOSIS — B34.9 VIRAL SYNDROME: Primary | ICD-10-CM

## 2018-05-22 DIAGNOSIS — E11.9 NEW ONSET TYPE 2 DIABETES MELLITUS: ICD-10-CM

## 2018-05-22 LAB
ALBUMIN SERPL BCP-MCNC: 3.1 G/DL
ALP SERPL-CCNC: 81 U/L
ALT SERPL W/O P-5'-P-CCNC: 30 U/L
ANION GAP SERPL CALC-SCNC: 9 MMOL/L
AST SERPL-CCNC: 18 U/L
BASOPHILS # BLD AUTO: 0.02 K/UL
BASOPHILS NFR BLD: 0.3 %
BILIRUB SERPL-MCNC: 0.4 MG/DL
BUN SERPL-MCNC: 13 MG/DL
CALCIUM SERPL-MCNC: 9 MG/DL
CHLORIDE SERPL-SCNC: 103 MMOL/L
CO2 SERPL-SCNC: 24 MMOL/L
CREAT SERPL-MCNC: 0.8 MG/DL
DIFFERENTIAL METHOD: ABNORMAL
EOSINOPHIL # BLD AUTO: 0.1 K/UL
EOSINOPHIL NFR BLD: 1.8 %
ERYTHROCYTE [DISTWIDTH] IN BLOOD BY AUTOMATED COUNT: 14.7 %
EST. GFR  (AFRICAN AMERICAN): >60 ML/MIN/1.73 M^2
EST. GFR  (NON AFRICAN AMERICAN): >60 ML/MIN/1.73 M^2
GLUCOSE SERPL-MCNC: 207 MG/DL
HCT VFR BLD AUTO: 35.8 %
HGB BLD-MCNC: 11.3 G/DL
LYMPHOCYTES # BLD AUTO: 1.7 K/UL
LYMPHOCYTES NFR BLD: 27.2 %
MCH RBC QN AUTO: 26.3 PG
MCHC RBC AUTO-ENTMCNC: 31.6 G/DL
MCV RBC AUTO: 83 FL
MONOCYTES # BLD AUTO: 0.4 K/UL
MONOCYTES NFR BLD: 5.8 %
NEUTROPHILS # BLD AUTO: 3.9 K/UL
NEUTROPHILS NFR BLD: 64.6 %
PLATELET # BLD AUTO: 292 K/UL
PMV BLD AUTO: 9.9 FL
POCT GLUCOSE: 209 MG/DL (ref 70–110)
POTASSIUM SERPL-SCNC: 3.6 MMOL/L
PROT SERPL-MCNC: 7.1 G/DL
RBC # BLD AUTO: 4.3 M/UL
SODIUM SERPL-SCNC: 136 MMOL/L
WBC # BLD AUTO: 6.07 K/UL

## 2018-05-22 PROCEDURE — 25000003 PHARM REV CODE 250: Performed by: EMERGENCY MEDICINE

## 2018-05-22 PROCEDURE — 96374 THER/PROPH/DIAG INJ IV PUSH: CPT

## 2018-05-22 PROCEDURE — 80053 COMPREHEN METABOLIC PANEL: CPT

## 2018-05-22 PROCEDURE — 63600175 PHARM REV CODE 636 W HCPCS: Performed by: EMERGENCY MEDICINE

## 2018-05-22 PROCEDURE — 99283 EMERGENCY DEPT VISIT LOW MDM: CPT | Mod: 25

## 2018-05-22 PROCEDURE — 85025 COMPLETE CBC W/AUTO DIFF WBC: CPT

## 2018-05-22 PROCEDURE — 82962 GLUCOSE BLOOD TEST: CPT

## 2018-05-22 PROCEDURE — 96361 HYDRATE IV INFUSION ADD-ON: CPT

## 2018-05-22 RX ORDER — KETOROLAC TROMETHAMINE 30 MG/ML
30 INJECTION, SOLUTION INTRAMUSCULAR; INTRAVENOUS
Status: COMPLETED | OUTPATIENT
Start: 2018-05-22 | End: 2018-05-22

## 2018-05-22 RX ORDER — OMEPRAZOLE 40 MG/1
40 CAPSULE, DELAYED RELEASE ORAL DAILY
COMMUNITY

## 2018-05-22 RX ORDER — METFORMIN HYDROCHLORIDE 500 MG/1
500 TABLET ORAL
Qty: 30 TABLET | Refills: 0 | Status: SHIPPED | OUTPATIENT
Start: 2018-05-22 | End: 2019-05-22

## 2018-05-22 RX ORDER — ONDANSETRON 4 MG/1
4 TABLET, ORALLY DISINTEGRATING ORAL
Status: COMPLETED | OUTPATIENT
Start: 2018-05-22 | End: 2018-05-22

## 2018-05-22 RX ADMIN — KETOROLAC TROMETHAMINE 30 MG: 30 INJECTION, SOLUTION INTRAMUSCULAR at 03:05

## 2018-05-22 RX ADMIN — ONDANSETRON 4 MG: 4 TABLET, ORALLY DISINTEGRATING ORAL at 03:05

## 2018-05-22 RX ADMIN — SODIUM CHLORIDE 1000 ML: 0.9 INJECTION, SOLUTION INTRAVENOUS at 03:05

## 2018-05-22 NOTE — ED PROVIDER NOTES
Encounter Date: 5/22/2018    SCRIBE #1 NOTE: I, Pawan Lowe, am scribing for, and in the presence of, Dr. Wyatt Gonzales.       History     Chief Complaint   Patient presents with    Headache     Patient presents to the ED with reports of having headache with nausea, chills, and diarrhea that started last night. Treated with x 2 tablets of exedrin with no relief.      This is a 37 y.o. female who has a past medical history of Hypertension; Hypothyroid; and Obesity.   The patient presents to the Emergency Department with complaints of headache since around 4PM yesterday.   Symptoms are associated with nausea, four episodes of vomiting, diarrhea, nasal congestion, and chills.  Pt denies increased frequency or back pain.   Pt reports taking two Excedrin with no relief of symptoms.   Pt has no past surgical history on file.        The history is provided by the patient. The history is limited by a language barrier. A  was used.     Review of patient's allergies indicates:  No Known Allergies  Past Medical History:   Diagnosis Date    Hypertension     Hypothyroid     Obesity      History reviewed. No pertinent surgical history.  Family History   Problem Relation Age of Onset    No Known Problems Mother     No Known Problems Father      Social History   Substance Use Topics    Smoking status: Never Smoker    Smokeless tobacco: Never Used    Alcohol use No     Review of Systems   Constitutional: Positive for chills.   HENT: Positive for congestion.    Gastrointestinal: Positive for diarrhea, nausea and vomiting.   Genitourinary: Negative for dysuria and frequency.   Musculoskeletal: Negative for back pain.   Neurological: Positive for headaches.       Physical Exam     Initial Vitals   BP Pulse Resp Temp SpO2   05/22/18 0242 05/22/18 0242 05/22/18 0242 05/22/18 0242 05/22/18 0300   123/63 108 20 99 °F (37.2 °C) 96 %      MAP       05/22/18 0242       83         Physical Exam    Constitutional:  She appears well-developed and well-nourished. She is Obese . No distress.   HENT:   Head: Normocephalic and atraumatic.   Right Ear: External ear normal.   Left Ear: External ear normal.   Mouth/Throat: Oropharynx is clear and moist.   Eyes: Conjunctivae and EOM are normal. Pupils are equal, round, and reactive to light. No scleral icterus.   Neck: Normal range of motion. Neck supple.   Cardiovascular: Normal rate, regular rhythm, normal heart sounds and intact distal pulses.   Pulmonary/Chest: Breath sounds normal. No respiratory distress. She has no wheezes.   Abdominal: Soft. Bowel sounds are normal. She exhibits no distension. There is no tenderness. There is no guarding.   Musculoskeletal: Normal range of motion.   Neurological: She is alert and oriented to person, place, and time. She has normal strength and normal reflexes.   Skin: Skin is warm and dry. Capillary refill takes less than 2 seconds.   Psychiatric: She has a normal mood and affect. Her behavior is normal.         ED Course   Procedures  Labs Reviewed   CBC W/ AUTO DIFFERENTIAL - Abnormal; Notable for the following:        Result Value    Hemoglobin 11.3 (*)     Hematocrit 35.8 (*)     MCH 26.3 (*)     MCHC 31.6 (*)     RDW 14.7 (*)     All other components within normal limits   COMPREHENSIVE METABOLIC PANEL - Abnormal; Notable for the following:     Glucose 207 (*)     Albumin 3.1 (*)     All other components within normal limits   POCT GLUCOSE - Abnormal; Notable for the following:     POCT Glucose 209 (*)     All other components within normal limits   POCT GLUCOSE                               ED Course as of May 22 0544   Tue May 22, 2018   0306 This is an emergent evaluation of a 37-year-old female presents with nausea, vomiting, diarrhea, headache, upper respiratory infection symptoms.  Overall, I believe the patient has viral syndrome.  Also check for electrolyte abnormalities, dehydration or acute kidney injury.     Plan for basic labs,  IV fluids, Toradol, Zofran.  [NP]   0307 I, Dr. Wyatt Gonzaels, personally performed the services described in this documentation.   All medical record entries made by the scribe were at my direction and in my presence.   I have reviewed the chart and agree that the record is accurate and complete.   Wyatt Gonzales MD.   [NP]   0346 WBC: 6.07 [NP]   0346 Hemoglobin: (!) 11.3 [NP]   0346 Platelets: 292 [NP]   0358 Sodium: 136 [NP]   0358 Potassium: 3.6 [NP]   0358 Chloride: 103 [NP]   0358 CO2: 24 [NP]   0358 Glucose: (!) 207 [NP]   0358 BUN, Bld: 13 [NP]   0358 Creatinine: 0.8 [NP]      ED Course User Index  [NP] Wyatt Gonzales MD     Overall impression is viral syndrome and new onset diabetes.  Patient counseled extensively on diabetes, diet, taking blood sugar, medication and need for compliance.  Patient to follow up with PCP as soon as possible.  She is to call today for an appointment.  Return for any concerns.      Clinical Impression:     1. Viral syndrome    2. New onset type 2 diabetes mellitus          Disposition:   Disposition: Discharged  Condition: Stable                        Wyatt Gonzales MD  05/22/18 9233

## 2018-05-22 NOTE — DISCHARGE INSTRUCTIONS
Thank you for choosing [facility]! We appreciate you coming to us for your medical care. We hope you feel better soon! Please come back to [facility] for all of your future medical needs.      Sincerely,    Wyatt Gonzales MD  Medical Director  Emergency Department

## 2018-05-22 NOTE — ED NOTES
Pt presents to ED c/o headache with nausea and vomiting X 5-6 episodes and diarrhea X 4 episodes. Pt reports taking Excedrin with no relief. Pt reports symptoms began last night.

## 2019-06-18 ENCOUNTER — HOSPITAL ENCOUNTER (EMERGENCY)
Facility: HOSPITAL | Age: 39
Discharge: HOME | End: 2019-06-18
Attending: EMERGENCY MEDICINE
Payer: MEDICAID

## 2019-06-18 VITALS
HEIGHT: 67 IN | BODY MASS INDEX: 45.99 KG/M2 | HEART RATE: 75 BPM | RESPIRATION RATE: 20 BRPM | DIASTOLIC BLOOD PRESSURE: 80 MMHG | OXYGEN SATURATION: 100 % | TEMPERATURE: 98 F | WEIGHT: 293 LBS | SYSTOLIC BLOOD PRESSURE: 168 MMHG

## 2019-06-18 DIAGNOSIS — R51.9 NONINTRACTABLE HEADACHE, UNSPECIFIED CHRONICITY PATTERN, UNSPECIFIED HEADACHE TYPE: Primary | ICD-10-CM

## 2019-06-18 DIAGNOSIS — I10 HTN (HYPERTENSION): ICD-10-CM

## 2019-06-18 LAB
ALBUMIN SERPL BCP-MCNC: 3.4 G/DL (ref 3.5–5.2)
ALP SERPL-CCNC: 87 U/L (ref 55–135)
ALT SERPL W/O P-5'-P-CCNC: 32 U/L (ref 10–44)
ANION GAP SERPL CALC-SCNC: 12 MMOL/L (ref 8–16)
AST SERPL-CCNC: 22 U/L (ref 10–40)
B-HCG UR QL: NEGATIVE
BACTERIA #/AREA URNS HPF: NORMAL /HPF
BASOPHILS # BLD AUTO: 0.02 K/UL (ref 0–0.2)
BASOPHILS NFR BLD: 0.3 % (ref 0–1.9)
BILIRUB SERPL-MCNC: 0.5 MG/DL (ref 0.1–1)
BILIRUB UR QL STRIP: NEGATIVE
BUN SERPL-MCNC: 14 MG/DL (ref 6–20)
CALCIUM SERPL-MCNC: 9.5 MG/DL (ref 8.7–10.5)
CHLORIDE SERPL-SCNC: 108 MMOL/L (ref 95–110)
CLARITY UR: CLEAR
CO2 SERPL-SCNC: 21 MMOL/L (ref 23–29)
COLOR UR: YELLOW
CREAT SERPL-MCNC: 0.8 MG/DL (ref 0.5–1.4)
CTP QC/QA: YES
DIFFERENTIAL METHOD: ABNORMAL
EOSINOPHIL # BLD AUTO: 0.1 K/UL (ref 0–0.5)
EOSINOPHIL NFR BLD: 1.4 % (ref 0–8)
ERYTHROCYTE [DISTWIDTH] IN BLOOD BY AUTOMATED COUNT: 15.9 % (ref 11.5–14.5)
EST. GFR  (AFRICAN AMERICAN): >60 ML/MIN/1.73 M^2
EST. GFR  (NON AFRICAN AMERICAN): >60 ML/MIN/1.73 M^2
GLUCOSE SERPL-MCNC: 108 MG/DL (ref 70–110)
GLUCOSE UR QL STRIP: NEGATIVE
HCT VFR BLD AUTO: 34.7 % (ref 37–48.5)
HGB BLD-MCNC: 10.6 G/DL (ref 12–16)
HGB UR QL STRIP: ABNORMAL
KETONES UR QL STRIP: NEGATIVE
LEUKOCYTE ESTERASE UR QL STRIP: ABNORMAL
LYMPHOCYTES # BLD AUTO: 1.8 K/UL (ref 1–4.8)
LYMPHOCYTES NFR BLD: 26.8 % (ref 18–48)
MCH RBC QN AUTO: 24 PG (ref 27–31)
MCHC RBC AUTO-ENTMCNC: 30.5 G/DL (ref 32–36)
MCV RBC AUTO: 79 FL (ref 82–98)
MICROSCOPIC COMMENT: NORMAL
MONOCYTES # BLD AUTO: 0.3 K/UL (ref 0.3–1)
MONOCYTES NFR BLD: 4.6 % (ref 4–15)
NEUTROPHILS # BLD AUTO: 4.3 K/UL (ref 1.8–7.7)
NEUTROPHILS NFR BLD: 66 % (ref 38–73)
NITRITE UR QL STRIP: NEGATIVE
PH UR STRIP: 6 [PH] (ref 5–8)
PLATELET # BLD AUTO: 381 K/UL (ref 150–350)
PMV BLD AUTO: 9.9 FL (ref 9.2–12.9)
POTASSIUM SERPL-SCNC: 4 MMOL/L (ref 3.5–5.1)
PROT SERPL-MCNC: 7 G/DL (ref 6–8.4)
PROT UR QL STRIP: NEGATIVE
RBC # BLD AUTO: 4.42 M/UL (ref 4–5.4)
RBC #/AREA URNS HPF: 2 /HPF (ref 0–4)
SODIUM SERPL-SCNC: 141 MMOL/L (ref 136–145)
SP GR UR STRIP: <=1.005 (ref 1–1.03)
TROPONIN I SERPL DL<=0.01 NG/ML-MCNC: 0.01 NG/ML (ref 0–0.03)
URN SPEC COLLECT METH UR: ABNORMAL
UROBILINOGEN UR STRIP-ACNC: NEGATIVE EU/DL
WBC # BLD AUTO: 6.56 K/UL (ref 3.9–12.7)
WBC #/AREA URNS HPF: 2 /HPF (ref 0–5)

## 2019-06-18 PROCEDURE — 85025 COMPLETE CBC W/AUTO DIFF WBC: CPT

## 2019-06-18 PROCEDURE — 81000 URINALYSIS NONAUTO W/SCOPE: CPT

## 2019-06-18 PROCEDURE — 80053 COMPREHEN METABOLIC PANEL: CPT

## 2019-06-18 PROCEDURE — 93010 EKG 12-LEAD: ICD-10-PCS | Mod: ,,, | Performed by: STUDENT IN AN ORGANIZED HEALTH CARE EDUCATION/TRAINING PROGRAM

## 2019-06-18 PROCEDURE — 99285 EMERGENCY DEPT VISIT HI MDM: CPT | Mod: 25

## 2019-06-18 PROCEDURE — 93010 ELECTROCARDIOGRAM REPORT: CPT | Mod: ,,, | Performed by: STUDENT IN AN ORGANIZED HEALTH CARE EDUCATION/TRAINING PROGRAM

## 2019-06-18 PROCEDURE — 25000003 PHARM REV CODE 250: Performed by: EMERGENCY MEDICINE

## 2019-06-18 PROCEDURE — 84484 ASSAY OF TROPONIN QUANT: CPT

## 2019-06-18 PROCEDURE — 93005 ELECTROCARDIOGRAM TRACING: CPT

## 2019-06-18 PROCEDURE — 81025 URINE PREGNANCY TEST: CPT | Performed by: EMERGENCY MEDICINE

## 2019-06-18 RX ORDER — AMLODIPINE BESYLATE 10 MG/1
10 TABLET ORAL DAILY
Qty: 30 TABLET | Refills: 1 | Status: SHIPPED | OUTPATIENT
Start: 2019-06-18 | End: 2020-06-17

## 2019-06-18 RX ORDER — HYDRALAZINE HYDROCHLORIDE 20 MG/ML
15 INJECTION INTRAMUSCULAR; INTRAVENOUS
Status: DISCONTINUED | OUTPATIENT
Start: 2019-06-18 | End: 2019-06-18

## 2019-06-18 RX ORDER — AMLODIPINE BESYLATE 5 MG/1
5 TABLET ORAL
Status: COMPLETED | OUTPATIENT
Start: 2019-06-18 | End: 2019-06-18

## 2019-06-18 RX ORDER — CAPTOPRIL 12.5 MG/1
12.5 TABLET ORAL
Status: DISCONTINUED | OUTPATIENT
Start: 2019-06-18 | End: 2019-06-18

## 2019-06-18 RX ADMIN — AMLODIPINE BESYLATE 5 MG: 5 TABLET ORAL at 10:06

## 2019-06-19 NOTE — ED NOTES
Pt back from CT, reconnected to pulse ox and cardiac monitor. BP retaken on right forearm. MD notified of BP- MD still wants to hold hydralazine.

## 2019-06-19 NOTE — ED NOTES
Pt presents to the ED w/ c/ of headache. Pt reports worsening headache over the past 2 days with nausea. Pt denies chest pain or SOB. Pt reports that headache worsened at 1600 today. Pt reports that she had HTN during pregnancy. reports had c section at 37weeks on may 23, 2019 due to HTN. States she has been taking prescribed losartan medication. Reports no improvement in BP. Reports taking ibuprofen 800mg at 1730 today without relief.

## 2019-06-19 NOTE — ED PROVIDER NOTES
Encounter Date: 2019    SCRIBE #1 NOTE: I, Andrew Forde, am scribing for, and in the presence of,  Dr. Carpio. I have scribed the entire note.       History     Chief Complaint   Patient presents with    Headache     headache pain with ongoing issues with HTN. Was seen at PCP office today prescribed losartan.  took 1 dose today but blood pressure remains high. Ibuprofen 800 mg at 1730 without relief     Ellie Ray is a 38 y.o. female who  has a past medical history of Hypertension, Hypothyroid, and Obesity.    The patient presents to the ED due to hypertension. The patient states her blood pressure was in the 200's. She patient reports nausea and headache that began two days ago. She states her headache worsened 4:00 PM today. She denies fever, vomiting, diarrhea, chest pain, or shortness of breath. The patient notes she had hypertension during her pregnancy. She also notes she had a  at 37 weeks on May 23, 2019 due to hypertension. She states she was prescribed Losartan HCTZ and Losartan potassium. The patient reports no other symptoms.     The history is provided by the patient. The history is limited by a language barrier. A  was used.     Review of patient's allergies indicates:  No Known Allergies  Past Medical History:   Diagnosis Date    Hypertension     Hypothyroid     Obesity      No past surgical history on file.  Family History   Problem Relation Age of Onset    No Known Problems Mother     No Known Problems Father      Social History     Tobacco Use    Smoking status: Never Smoker    Smokeless tobacco: Never Used   Substance Use Topics    Alcohol use: No    Drug use: No     Review of Systems   Constitutional: Negative for chills and fever.   HENT: Negative for congestion, rhinorrhea and sore throat.    Eyes: Negative for redness and visual disturbance.   Respiratory: Negative for cough, shortness of breath and wheezing.    Cardiovascular: Negative for  chest pain and palpitations.   Gastrointestinal: Positive for nausea. Negative for abdominal pain, diarrhea and vomiting.   Genitourinary: Negative for dysuria and hematuria.   Musculoskeletal: Negative for back pain, myalgias and neck pain.   Skin: Negative for rash.   Neurological: Positive for headaches. Negative for dizziness, weakness and light-headedness.   Psychiatric/Behavioral: Negative for confusion.       Physical Exam     Initial Vitals [06/18/19 2020]   BP Pulse Resp Temp SpO2   (!) 202/119 (!) 114 20 99.2 °F (37.3 °C) 98 %      MAP       --         Physical Exam    Nursing note and vitals reviewed.  Constitutional: She appears well-developed and well-nourished. She is not diaphoretic. No distress.   HENT:   Head: Normocephalic and atraumatic.   Mouth/Throat: Oropharynx is clear and moist.   Eyes: Conjunctivae and EOM are normal. Pupils are equal, round, and reactive to light.   Neck: Normal range of motion. Neck supple.   Cardiovascular: Normal rate, regular rhythm and normal heart sounds. Exam reveals no gallop and no friction rub.    No murmur heard.  Pulmonary/Chest: Breath sounds normal. She has no wheezes. She has no rhonchi. She has no rales.   Abdominal: Soft. Bowel sounds are normal. There is no tenderness. There is no rebound and no guarding.   Musculoskeletal: Normal range of motion. She exhibits no edema or tenderness.   Lymphadenopathy:     She has no cervical adenopathy.   Neurological: She is alert and oriented to person, place, and time. She has normal strength.   CN 2-12 intact  Finger to nose within normal limits  Negative romberg  Gait normal  Equal strength to bilateral upper extremities, SILT  Equal strength to bilateral lower extremities, SILT     Skin: Skin is warm and dry. Capillary refill takes less than 2 seconds. No rash noted.         ED Course   Procedures  Labs Reviewed   CBC W/ AUTO DIFFERENTIAL - Abnormal; Notable for the following components:       Result Value     Hemoglobin 10.6 (*)     Hematocrit 34.7 (*)     Mean Corpuscular Volume 79 (*)     Mean Corpuscular Hemoglobin 24.0 (*)     Mean Corpuscular Hemoglobin Conc 30.5 (*)     RDW 15.9 (*)     Platelets 381 (*)     All other components within normal limits   COMPREHENSIVE METABOLIC PANEL - Abnormal; Notable for the following components:    CO2 21 (*)     Albumin 3.4 (*)     All other components within normal limits   URINALYSIS, REFLEX TO URINE CULTURE - Abnormal; Notable for the following components:    Specific Gravity, UA <=1.005 (*)     Occult Blood UA 3+ (*)     Leukocytes, UA Trace (*)     All other components within normal limits    Narrative:     Preferred Collection Type->Urine, Clean Catch   TROPONIN I   TROPONIN I   URINALYSIS MICROSCOPIC    Narrative:     Preferred Collection Type->Urine, Clean Catch   POCT URINE PREGNANCY     EKG Readings: (Independently Interpreted)   Initial Reading: No STEMI. Rhythm: Normal Sinus Rhythm. Heart Rate: 97.   No ST segment changes.        Imaging Results          CT Head Without Contrast (Final result)  Result time 06/18/19 21:26:31    Final result by Shakeel Lara MD (06/18/19 21:26:31)                 Impression:      No acute intracranial abnormalities identified.      Electronically signed by: Shakeel Lara MD  Date:    06/18/2019  Time:    21:26             Narrative:    EXAMINATION:  CT HEAD WITHOUT CONTRAST    CLINICAL HISTORY:  Headache, acute, norm neuro exam;    TECHNIQUE:  Low dose axial images were obtained through the head.  Coronal and sagittal reformations were also performed. Contrast was not administered.    COMPARISON:  CT head from August 2017.    FINDINGS:  The brain is normally formed and exhibits normal density throughout with no indication of acute/recent major vascular distribution cerebral infarction, intraparenchymal hemorrhage, or intra-axial space occupying lesion. The ventricular system is normal in size and configuration with no evidence of  hydrocephalus. No effacement of the skull-base cisterns. No abnormal extra-axial fluid collections or blood products. Visualized paranasal sinuses and mastoid air cells are clear. The calvarium shows no significant abnormality.                                 Medical Decision Making:   Initial Assessment:   The patient presents to the ED due to hypertension.  Differential Diagnosis:   Differential Diagnosis includes, but is not limited to:  Hypertensive encephalopathy, CVA/TIA, intracranial hemorrhage, MI/ACS, aortic/carotid artery dissection, AAA, hypertensive nephropathy, medication non-compliance, anxiety, drug abuse/intoxication, essential hypertension preeclampsia          Clinical Tests:   Medical Tests: Ordered and Reviewed  ED Management:  [age]-year-old female presents with hypertension and associated headache.  Patient has had headaches for the past couple of days however she reported a sudden worsening headache today around 4:00 p.m. so CT head was ordered.  There is no evidence subarachnoid hemorrhage in this CT that was done within 6 hr of her symptoms.  Patient's headache improved after analgesia.  She has no focal neuro deficits.  I do not suspect an emergent cause of her headache at this time.  Her lab work does not reveal proteinuria or evidence of preeclampsia.  Patient does have an underlying diagnosis of primary hypertension I suspect her symptoms here today are due to uncontrolled primary hypertension.    Will discharge patient with amlodipine.    Patient given return precautions for worsening headache numbness weakness or any other concerns. Patient verbalized understanding.      After taking into careful account the historical factors and physical exam findings of the patient's presentation today, in conjunction with the empirical and objective data obtained on ED workup, no acute emergent medical condition has been identified. The patient appears to be low risk for an emergent medical  condition and I feel it is safe and appropriate at this time for the patient to be discharged to follow-up as detailed in their discharge instructions for reevaluation and possible continued outpatient workup and management. I have discussed the specifics of the workup with the patient and the patient has verbalized understanding of the details of the workup, the diagnosis, the treatment plan, and the need for outpatient follow-up.  Although the patient has no emergent etiology today this does not preclude the development of an emergent condition so in addition, I have advised the patient that they can return to the ED and/or activate EMS at any time with worsening of their symptoms, change of their symptoms, or with any other medical complaint.  The patient remained comfortable and stable during their visit in the ED.  Discharge and follow-up instructions discussed with the patient who expressed understanding and willingness to comply with my recommendations.                         Clinical Impression:       ICD-10-CM ICD-9-CM   1. Nonintractable headache, unspecified chronicity pattern, unspecified headache type R51 784.0   2. HTN (hypertension) I10 401.9            Scribe Attestation I, Jaydon Carpio,  personally performed the services described in this documentation. All medical record entries made by the scribe were at my direction and in my presence.  I have reviewed the chart and agree that the record reflects my personal performance and is accurate and complete. Jaydon Carpio M.D. 11:24 PM06/19/2019                      Jaydon Carpio Jr., MD  06/19/19 0559

## 2021-04-02 ENCOUNTER — IMMUNIZATION (OUTPATIENT)
Dept: PRIMARY CARE CLINIC | Facility: CLINIC | Age: 41
End: 2021-04-02

## 2021-04-02 DIAGNOSIS — Z23 NEED FOR VACCINATION: Primary | ICD-10-CM

## 2021-04-02 PROCEDURE — 91300 PR SARS-COV- 2 COVID-19 VACCINE, NO PRSV, 30MCG/0.3ML, IM: CPT | Mod: S$GLB,,, | Performed by: INTERNAL MEDICINE

## 2021-04-02 PROCEDURE — 91300 PR SARS-COV- 2 COVID-19 VACCINE, NO PRSV, 30MCG/0.3ML, IM: ICD-10-PCS | Mod: S$GLB,,, | Performed by: INTERNAL MEDICINE

## 2021-04-02 PROCEDURE — 0001A PR IMMUNIZ ADMIN, SARS-COV-2 COVID-19 VACC, 30MCG/0.3ML, 1ST DOSE: ICD-10-PCS | Mod: CV19,S$GLB,, | Performed by: INTERNAL MEDICINE

## 2021-04-02 PROCEDURE — 0001A PR IMMUNIZ ADMIN, SARS-COV-2 COVID-19 VACC, 30MCG/0.3ML, 1ST DOSE: CPT | Mod: CV19,S$GLB,, | Performed by: INTERNAL MEDICINE

## 2021-04-02 RX ADMIN — Medication 0.3 ML: at 05:04

## 2021-04-23 ENCOUNTER — IMMUNIZATION (OUTPATIENT)
Dept: PRIMARY CARE CLINIC | Facility: CLINIC | Age: 41
End: 2021-04-23

## 2021-04-23 DIAGNOSIS — Z23 NEED FOR VACCINATION: Primary | ICD-10-CM

## 2021-12-14 ENCOUNTER — IMMUNIZATION (OUTPATIENT)
Dept: PRIMARY CARE CLINIC | Facility: CLINIC | Age: 41
End: 2021-12-14

## 2021-12-14 DIAGNOSIS — Z23 NEED FOR VACCINATION: Primary | ICD-10-CM

## 2021-12-14 PROCEDURE — 0004A COVID-19, MRNA, LNP-S, PF, 30 MCG/0.3 ML DOSE VACCINE: CPT | Mod: CV19,PBBFAC | Performed by: INTERNAL MEDICINE

## 2024-01-17 NOTE — ED PROVIDER NOTES
Encounter Date: 9/18/2017       History     Chief Complaint   Patient presents with    Palpitations     Palpitations with nausea since 9 pm. Presents awake, alert, oriented. No distress noted. Seen here 9/14 for same.     CHIEF COMPLAINT: Patient presents with: palpitations    HISTORY OF PRESENT ILLNESS: Ellie Ray who is a 37 y.o. presents to the emergency department today with complaint of palpitations.   She reports that she has been having some palpitations with slight nausea since 9:00 p.m..  She did have  And argument with her significant other prior to this occurring.  She reports that this has happened in the past she has had an argument with her significant other.  She denies any chest pain.  Denies shortness of breath.  At this time she does not feel as though her heart is racing nor does not feel irregular.  She has been worked up with in the past for similar presentation.  She is not seeing cardiology.    REVIEW OF SYSTEMS:  Constitutional: No fever, no chills.  Eyes: No discharge. No pain.  HENT: No nasal drainage. No ear ache. No sore throat.   Cardiovascular:   See above.  Respiratory: No cough, no shortness of breath.  Gastrointestinal: No abdominal pain, no vomiting. No diarrhea  Genitourinary: No hematuria, dysuria, urgency.  Musculoskeletal: No back pain.  Skin: No rashes, no lesions.  Neurological: No headache, no focal weakness.    ALLERGIES REVIEWED  MEDICATIONS REVIEWED  PMH/PSH/SOC/FH REVIEWED     The history is provided by the patient.    Nursing/Ancillary staff note reviewed.                  Review of patient's allergies indicates:  No Known Allergies  Past Medical History:   Diagnosis Date    Hypertension     Hypothyroid     Obesity      History reviewed. No pertinent surgical history.  Family History   Problem Relation Age of Onset    No Known Problems Mother     No Known Problems Father      Social History   Substance Use Topics    Smoking status: Never Smoker    Smokeless  Pt will go up   tobacco: Never Used    Alcohol use No     Review of Systems   All other systems reviewed and are negative.      Physical Exam     Initial Vitals [09/18/17 0305]   BP Pulse Resp Temp SpO2   139/86 (!) 123 17 98.3 °F (36.8 °C) 99 %      MAP       103.67         Physical Exam    Nursing note and vitals reviewed.  Constitutional: She appears well-developed and well-nourished.   HENT:   Head: Normocephalic and atraumatic.   Nose: Nose normal.   Mouth/Throat: Oropharynx is clear and moist.   Eyes: Conjunctivae and EOM are normal. Pupils are equal, round, and reactive to light. No scleral icterus.   Neck: Normal range of motion. Neck supple. No JVD present.   Cardiovascular: Regular rhythm, normal heart sounds and intact distal pulses. Exam reveals no gallop and no friction rub.    No murmur heard.  Tachycardic rate    Pulmonary/Chest: Breath sounds normal. No stridor. No respiratory distress. She has no wheezes. She exhibits no tenderness.   Abdominal: Soft. Bowel sounds are normal. She exhibits no distension and no mass. There is no tenderness. There is no rebound and no guarding.   Musculoskeletal: Normal range of motion. She exhibits no edema or tenderness.   Neurological: She is alert and oriented to person, place, and time. She has normal strength. No cranial nerve deficit.   Skin: Skin is warm and dry. No rash noted. No pallor.   Psychiatric: She has a normal mood and affect. Thought content normal.         ED Course   Procedures  Labs Reviewed   CBC W/ AUTO DIFFERENTIAL   COMPREHENSIVE METABOLIC PANEL   TSH   URINALYSIS   POCT URINE PREGNANCY     EKG Readings: (Independently Interpreted)   113 bpm, sinus tachycardia, normal axis, normal intervals, no STEMI otherwise normal EKG          Medical Decision Making:   Differential Diagnosis:   SVT, A. fib, a flutter, electrolyte abnormalities, hyperthyroidism, anxiety.      Independently Interpreted Test(s):   I have ordered and independently interpreted EKG Reading(s)  - see prior notes  Clinical Tests:   Lab Tests: Ordered and Reviewed  Medical Tests: Ordered and Reviewed  ED Management:  This is a 37-year-old female presents to the emergency department stay with palpitations.  Appears that her palpitations occur after she has an argument with her .  She does not seem to experience these palpitations at any other time.  She was recently seen and had a negative workup.  Today's workup is also negative.  She had a mild sinus tachycardia on her EKG but throughout her stay here in the emergency department she has come down into appropriate levels without any medications or intervention.  Her workup today does not show any need for emergent hospitalization or cardiology evaluation.  She does have a UTI but she is on appropriate antibiotics for her UTI.  I'll discharge her home with some Vistaril she has mentioned that she is having trouble sleeping at night.  I'll have her follow-up with her primary care physician for further evaluation needed.  The patient is comfortable with this plan and comfortable going home. After taking into careful account the historical factors and physical exam findings of the patient's presentation today, in conjunction with the empirical and objective data obtained on ED workup, no acute emergent medical condition has been identified. The patient appears to be low risk for an emergent medical condition and I feel it is safe and appropriate at this time for the patient to be discharged to follow-up as detailed in their discharge instructions for reevaluation and possible continued outpatient workup and management. I have discussed the specifics of the workup with the patient and the patient has verbalized understanding of the details of the workup, the diagnosis, the treatment plan, and the need for outpatient follow-up.  Although the patient has no emergent etiology today this does not preclude the development of an emergent condition so in  addition, I have advised the patient that they can return to the ED and/or activate EMS at any time with worsening of their symptoms, change of their symptoms, or with any other medical complaint.  The patient remained comfortable and stable during their visit in the ED.  Discharge and follow-up instructions discussed with the patient who expressed understanding and willingness to comply with my recommendations.     This medical record was prepared using voice dictation software. There may be phonetic errors.                   ED Course    0415 the patient is feeling improved.  She no longer feels as though her heart is racing.  She feels much better.  She does discussed with me the that she is having some issues with her sleep.  She is not sleeping through the entire night would like some medications to help with this.  I discussed with her putting on a mild medication like Vistaril and she agrees.  I did discuss with her that she will need to follow up with her primary care physician for further evaluation.    6024  I discussed results of the workup with the patient.  There is no need for any emergent hospitalization.  She understands that she will need to follow up with cardiology.  I have encouraged her to avoid any stressful situations.  The patient reports understanding. Discussed reasons to return and importance of followup.  Patient understands that the emergency visit today is primarily to address immediate concerns and to rule out emergent cause of symptoms and that they may require further workup and evaluation as an outpatient. All questions addressed and patient given discharge instructions and followup information.     Clinical Impression:   The primary encounter diagnosis was Palpitations. A diagnosis of Urinary tract infection without hematuria, site unspecified was also pertinent to this visit.                           Ivon Burnette MD  10/02/17 0015